# Patient Record
Sex: FEMALE | Race: WHITE
[De-identification: names, ages, dates, MRNs, and addresses within clinical notes are randomized per-mention and may not be internally consistent; named-entity substitution may affect disease eponyms.]

---

## 2017-02-21 PROCEDURE — 83516 IMMUNOASSAY NONANTIBODY: CPT | Performed by: INTERNAL MEDICINE

## 2017-02-21 PROCEDURE — 82784 ASSAY IGA/IGD/IGG/IGM EACH: CPT | Performed by: INTERNAL MEDICINE

## 2017-02-21 PROCEDURE — 86255 FLUORESCENT ANTIBODY SCREEN: CPT | Performed by: INTERNAL MEDICINE

## 2017-02-21 PROCEDURE — 82607 VITAMIN B-12: CPT | Performed by: INTERNAL MEDICINE

## 2017-03-01 PROBLEM — M25.532 BILATERAL WRIST PAIN: Status: ACTIVE | Noted: 2017-03-01

## 2017-03-01 PROBLEM — M25.531 BILATERAL WRIST PAIN: Status: ACTIVE | Noted: 2017-03-01

## 2017-03-01 PROBLEM — M75.102 ROTATOR CUFF SYNDROME, LEFT: Status: ACTIVE | Noted: 2017-03-01

## 2017-03-01 PROBLEM — G56.03 BILATERAL CARPAL TUNNEL SYNDROME: Status: ACTIVE | Noted: 2017-03-01

## 2017-03-01 PROBLEM — M25.512 ACUTE PAIN OF LEFT SHOULDER: Status: ACTIVE | Noted: 2017-03-01

## 2017-03-21 PROBLEM — M25.522 LEFT ELBOW PAIN: Status: ACTIVE | Noted: 2017-03-21

## 2017-04-20 PROBLEM — G56.32 RADIAL TUNNEL SYNDROME, LEFT: Status: ACTIVE | Noted: 2017-04-20

## 2017-07-25 PROBLEM — R31.0 GROSS HEMATURIA: Status: ACTIVE | Noted: 2017-07-25

## 2017-07-25 PROCEDURE — 88108 CYTOPATH CONCENTRATE TECH: CPT | Performed by: UROLOGY

## 2017-10-06 PROBLEM — M77.02 MEDIAL EPICONDYLITIS OF LEFT ELBOW: Status: ACTIVE | Noted: 2017-10-06

## 2017-10-12 PROBLEM — Z47.89 AFTERCARE FOLLOWING SURGERY OF THE MUSCULOSKELETAL SYSTEM: Status: ACTIVE | Noted: 2017-10-12

## 2017-10-30 PROBLEM — G43.009 MIGRAINE WITHOUT AURA AND WITHOUT STATUS MIGRAINOSUS, NOT INTRACTABLE: Status: ACTIVE | Noted: 2017-10-30

## 2019-05-30 PROCEDURE — 81001 URINALYSIS AUTO W/SCOPE: CPT | Performed by: INTERNAL MEDICINE

## 2020-02-25 PROCEDURE — 88305 TISSUE EXAM BY PATHOLOGIST: CPT | Performed by: INTERNAL MEDICINE

## 2020-03-16 ENCOUNTER — TELEPHONE (OUTPATIENT)
Dept: SCHEDULING | Age: 60
End: 2020-03-16

## 2020-03-17 ENCOUNTER — APPOINTMENT (OUTPATIENT)
Dept: URGENT CARE | Age: 60
End: 2020-03-17

## 2020-03-17 ENCOUNTER — WALK IN (OUTPATIENT)
Dept: URGENT CARE | Age: 60
End: 2020-03-17

## 2020-03-17 VITALS — TEMPERATURE: 98.1 F

## 2020-03-17 DIAGNOSIS — Z23 NEED FOR VACCINATION: Primary | ICD-10-CM

## 2020-03-17 PROCEDURE — 90471 IMMUNIZATION ADMIN: CPT | Performed by: NURSE PRACTITIONER

## 2020-03-17 PROCEDURE — 90686 IIV4 VACC NO PRSV 0.5 ML IM: CPT | Performed by: NURSE PRACTITIONER

## 2021-01-22 PROCEDURE — 88305 TISSUE EXAM BY PATHOLOGIST: CPT | Performed by: OBSTETRICS & GYNECOLOGY

## 2021-08-21 ENCOUNTER — HOSPITAL ENCOUNTER (EMERGENCY)
Facility: HOSPITAL | Age: 61
Discharge: HOME | End: 2021-08-21
Attending: EMERGENCY MEDICINE
Payer: COMMERCIAL

## 2021-08-21 ENCOUNTER — APPOINTMENT (OUTPATIENT)
Dept: CT IMAGING | Facility: HOSPITAL | Age: 61
End: 2021-08-21
Attending: EMERGENCY MEDICINE
Payer: COMMERCIAL

## 2021-08-21 VITALS
BODY MASS INDEX: 24.8 KG/M2 | OXYGEN SATURATION: 97 % | TEMPERATURE: 98 F | SYSTOLIC BLOOD PRESSURE: 123 MMHG | DIASTOLIC BLOOD PRESSURE: 64 MMHG | WEIGHT: 140 LBS | RESPIRATION RATE: 18 BRPM | HEIGHT: 63 IN | HEART RATE: 72 BPM

## 2021-08-21 DIAGNOSIS — N30.01 ACUTE CYSTITIS WITH HEMATURIA: Primary | ICD-10-CM

## 2021-08-21 LAB
ALBUMIN SERPL-MCNC: 3.7 G/DL (ref 3.4–5)
ALP LIVER SERPL-CCNC: 98 U/L
ALT SERPL-CCNC: 44 U/L
ANION GAP SERPL CALC-SCNC: 3 MMOL/L (ref 0–18)
AST SERPL-CCNC: 22 U/L (ref 15–37)
BASOPHILS # BLD AUTO: 0.02 X10(3) UL (ref 0–0.2)
BASOPHILS NFR BLD AUTO: 0.6 %
BILIRUB DIRECT SERPL-MCNC: <0.1 MG/DL (ref 0–0.2)
BILIRUB SERPL-MCNC: 0.3 MG/DL (ref 0.1–2)
BILIRUB UR QL: NEGATIVE
BUN BLD-MCNC: 16 MG/DL (ref 7–18)
BUN/CREAT SERPL: 22.2 (ref 10–20)
CALCIUM BLD-MCNC: 8.9 MG/DL (ref 8.5–10.1)
CHLORIDE SERPL-SCNC: 108 MMOL/L (ref 98–112)
CO2 SERPL-SCNC: 31 MMOL/L (ref 21–32)
COLOR UR: YELLOW
CREAT BLD-MCNC: 0.72 MG/DL
DEPRECATED RDW RBC AUTO: 46 FL (ref 35.1–46.3)
EOSINOPHIL # BLD AUTO: 0.08 X10(3) UL (ref 0–0.7)
EOSINOPHIL NFR BLD AUTO: 2.4 %
ERYTHROCYTE [DISTWIDTH] IN BLOOD BY AUTOMATED COUNT: 13.3 % (ref 11–15)
GLUCOSE BLD-MCNC: 101 MG/DL (ref 70–99)
GLUCOSE UR-MCNC: NEGATIVE MG/DL
HCT VFR BLD AUTO: 44.1 %
HGB BLD-MCNC: 14.4 G/DL
IMM GRANULOCYTES # BLD AUTO: 0 X10(3) UL (ref 0–1)
IMM GRANULOCYTES NFR BLD: 0 %
KETONES UR-MCNC: NEGATIVE MG/DL
LEUKOCYTE ESTERASE UR QL STRIP.AUTO: NEGATIVE
LIPASE SERPL-CCNC: 123 U/L (ref 73–393)
LYMPHOCYTES # BLD AUTO: 0.74 X10(3) UL (ref 1–4)
LYMPHOCYTES NFR BLD AUTO: 22.2 %
M PROTEIN MFR SERPL ELPH: 7.7 G/DL (ref 6.4–8.2)
MCH RBC QN AUTO: 30.1 PG (ref 26–34)
MCHC RBC AUTO-ENTMCNC: 32.7 G/DL (ref 31–37)
MCV RBC AUTO: 92.1 FL
MONOCYTES # BLD AUTO: 0.44 X10(3) UL (ref 0.1–1)
MONOCYTES NFR BLD AUTO: 13.2 %
NEUTROPHILS # BLD AUTO: 2.06 X10 (3) UL (ref 1.5–7.7)
NEUTROPHILS # BLD AUTO: 2.06 X10(3) UL (ref 1.5–7.7)
NEUTROPHILS NFR BLD AUTO: 61.6 %
NITRITE UR QL STRIP.AUTO: NEGATIVE
OSMOLALITY SERPL CALC.SUM OF ELEC: 295 MOSM/KG (ref 275–295)
PH UR: 5 [PH] (ref 5–8)
PLATELET # BLD AUTO: 203 10(3)UL (ref 150–450)
POTASSIUM SERPL-SCNC: 3.7 MMOL/L (ref 3.5–5.1)
PROT UR-MCNC: 30 MG/DL
RBC # BLD AUTO: 4.79 X10(6)UL
RBC #/AREA URNS AUTO: >10 /HPF
SODIUM SERPL-SCNC: 142 MMOL/L (ref 136–145)
SP GR UR STRIP: 1.03 (ref 1–1.03)
UROBILINOGEN UR STRIP-ACNC: <2
WBC # BLD AUTO: 3.3 X10(3) UL (ref 4–11)

## 2021-08-21 PROCEDURE — 80048 BASIC METABOLIC PNL TOTAL CA: CPT

## 2021-08-21 PROCEDURE — 81001 URINALYSIS AUTO W/SCOPE: CPT | Performed by: EMERGENCY MEDICINE

## 2021-08-21 PROCEDURE — 83690 ASSAY OF LIPASE: CPT | Performed by: EMERGENCY MEDICINE

## 2021-08-21 PROCEDURE — 85025 COMPLETE CBC W/AUTO DIFF WBC: CPT

## 2021-08-21 PROCEDURE — 85025 COMPLETE CBC W/AUTO DIFF WBC: CPT | Performed by: EMERGENCY MEDICINE

## 2021-08-21 PROCEDURE — 74176 CT ABD & PELVIS W/O CONTRAST: CPT | Performed by: EMERGENCY MEDICINE

## 2021-08-21 PROCEDURE — 87086 URINE CULTURE/COLONY COUNT: CPT | Performed by: EMERGENCY MEDICINE

## 2021-08-21 PROCEDURE — 96360 HYDRATION IV INFUSION INIT: CPT

## 2021-08-21 PROCEDURE — 80076 HEPATIC FUNCTION PANEL: CPT | Performed by: EMERGENCY MEDICINE

## 2021-08-21 PROCEDURE — 96361 HYDRATE IV INFUSION ADD-ON: CPT

## 2021-08-21 PROCEDURE — 80048 BASIC METABOLIC PNL TOTAL CA: CPT | Performed by: EMERGENCY MEDICINE

## 2021-08-21 PROCEDURE — 99284 EMERGENCY DEPT VISIT MOD MDM: CPT

## 2021-08-21 RX ORDER — ACETAMINOPHEN 500 MG
1000 TABLET ORAL ONCE
Status: COMPLETED | OUTPATIENT
Start: 2021-08-21 | End: 2021-08-21

## 2021-08-21 RX ORDER — CEPHALEXIN 500 MG/1
500 CAPSULE ORAL 2 TIMES DAILY
Qty: 10 CAPSULE | Refills: 0 | Status: SHIPPED | OUTPATIENT
Start: 2021-08-21 | End: 2021-08-26

## 2021-08-21 NOTE — ED PROVIDER NOTES
Patient Seen in: [facility] AND [facility] Emergency Department    History   Patient presents with:  Abdomen/Flank Pain      HPI    57-year-old female presents the ER with complaint of left lower quadrant pain.   Patient states that she has been having the pain on NASAL SCOPY,REMV TOTL ETHMOID  11/5/08    Performed by Lala Torres at [facility]   • OTHER SURGICAL HISTORY  12-23-09    Cystoscopy -Dr. Beau Sherman   • OTHER SURGICAL HISTORY  8/30/2016    Cystoscopy - Dr. Norma Guerra interaction with the patient were taken. The patient was already wearing a droplet mask on my arrival to the room.  Personal protective equipment including droplet mask, eye protection, and gloves were worn throughout the duration of the exam.  Handwashing Notable for the following components:    Clarity Urine Cloudy (*)     Blood Urine Large (*)     Protein Urine 30  (*)     WBC Urine 11-20 (*)     RBC Urine >10 (*)     Bacteria Urine 2+ (*)     Squamous Epi.  Cells Few (*)     All other components within no 8/21/21 1222)   acetaminophen (TYLENOL EXTRA STRENGTH) tab 1,000 mg (1,000 mg Oral Given 8/21/21 1321)         MDM      08/21/21  1120   BP: 155/79   Pulse: 81   Resp: 18   Temp: 97.6 °F (36.4 °C)   SpO2: 97%   Weight: 63.5 kg   Height: 160 cm (5' 3\")

## 2021-08-21 NOTE — ED INITIAL ASSESSMENT (HPI)
Pt report luq pain that radiates into her back with diarrhea for the past 4 days.  Pt denies any fever or urinary symptoms

## 2021-08-31 PROBLEM — M47.812 CERVICAL SPONDYLOSIS WITHOUT MYELOPATHY: Status: ACTIVE | Noted: 2021-08-31

## 2024-12-26 ENCOUNTER — HOSPITAL ENCOUNTER (EMERGENCY)
Facility: HOSPITAL | Age: 64
Discharge: HOME | End: 2024-12-26
Attending: EMERGENCY MEDICINE
Payer: COMMERCIAL

## 2024-12-26 ENCOUNTER — APPOINTMENT (OUTPATIENT)
Dept: GENERAL RADIOLOGY | Facility: HOSPITAL | Age: 64
End: 2024-12-26
Attending: EMERGENCY MEDICINE
Payer: COMMERCIAL

## 2024-12-26 VITALS
TEMPERATURE: 98 F | HEART RATE: 77 BPM | OXYGEN SATURATION: 98 % | SYSTOLIC BLOOD PRESSURE: 150 MMHG | DIASTOLIC BLOOD PRESSURE: 79 MMHG | HEIGHT: 63 IN | RESPIRATION RATE: 18 BRPM | WEIGHT: 140 LBS | BODY MASS INDEX: 24.8 KG/M2

## 2024-12-26 DIAGNOSIS — R07.9 CHEST PAIN OF UNCERTAIN ETIOLOGY: Primary | ICD-10-CM

## 2024-12-26 LAB
ANION GAP SERPL CALC-SCNC: 7 MMOL/L (ref 0–18)
ATRIAL RATE: 72 BPM
BASOPHILS # BLD AUTO: 0.04 X10(3) UL (ref 0–0.2)
BASOPHILS NFR BLD AUTO: 0.7 %
BUN BLD-MCNC: 15 MG/DL (ref 9–23)
BUN/CREAT SERPL: 23.4 (ref 10–20)
CALCIUM BLD-MCNC: 10.2 MG/DL (ref 8.7–10.4)
CHLORIDE SERPL-SCNC: 105 MMOL/L (ref 98–112)
CO2 SERPL-SCNC: 31 MMOL/L (ref 21–32)
CREAT BLD-MCNC: 0.64 MG/DL
DEPRECATED RDW RBC AUTO: 46.3 FL (ref 35.1–46.3)
EGFRCR SERPLBLD CKD-EPI 2021: 97 ML/MIN/1.73M2 (ref 60–?)
EOSINOPHIL # BLD AUTO: 0.25 X10(3) UL (ref 0–0.7)
EOSINOPHIL NFR BLD AUTO: 4.1 %
ERYTHROCYTE [DISTWIDTH] IN BLOOD BY AUTOMATED COUNT: 14.1 % (ref 11–15)
GLUCOSE BLD-MCNC: 91 MG/DL (ref 70–99)
HCT VFR BLD AUTO: 37.4 %
HGB BLD-MCNC: 12.2 G/DL
IMM GRANULOCYTES # BLD AUTO: 0.01 X10(3) UL (ref 0–1)
IMM GRANULOCYTES NFR BLD: 0.2 %
LYMPHOCYTES # BLD AUTO: 1.55 X10(3) UL (ref 1–4)
LYMPHOCYTES NFR BLD AUTO: 25.4 %
MCH RBC QN AUTO: 29.2 PG (ref 26–34)
MCHC RBC AUTO-ENTMCNC: 32.6 G/DL (ref 31–37)
MCV RBC AUTO: 89.5 FL
MONOCYTES # BLD AUTO: 0.68 X10(3) UL (ref 0.1–1)
MONOCYTES NFR BLD AUTO: 11.1 %
NEUTROPHILS # BLD AUTO: 3.57 X10 (3) UL (ref 1.5–7.7)
NEUTROPHILS # BLD AUTO: 3.57 X10(3) UL (ref 1.5–7.7)
NEUTROPHILS NFR BLD AUTO: 58.5 %
OSMOLALITY SERPL CALC.SUM OF ELEC: 296 MOSM/KG (ref 275–295)
P AXIS: 45 DEGREES
P-R INTERVAL: 148 MS
PLATELET # BLD AUTO: 278 10(3)UL (ref 150–450)
POTASSIUM SERPL-SCNC: 3.7 MMOL/L (ref 3.5–5.1)
Q-T INTERVAL: 396 MS
QRS DURATION: 80 MS
QTC CALCULATION (BEZET): 433 MS
R AXIS: 71 DEGREES
RBC # BLD AUTO: 4.18 X10(6)UL
SODIUM SERPL-SCNC: 143 MMOL/L (ref 136–145)
T AXIS: 61 DEGREES
TROPONIN I SERPL HS-MCNC: 4 NG/L
TROPONIN I SERPL HS-MCNC: 4 NG/L
VENTRICULAR RATE: 72 BPM
WBC # BLD AUTO: 6.1 X10(3) UL (ref 4–11)

## 2024-12-26 PROCEDURE — 93005 ELECTROCARDIOGRAM TRACING: CPT

## 2024-12-26 PROCEDURE — 85025 COMPLETE CBC W/AUTO DIFF WBC: CPT | Performed by: EMERGENCY MEDICINE

## 2024-12-26 PROCEDURE — 36415 COLL VENOUS BLD VENIPUNCTURE: CPT

## 2024-12-26 PROCEDURE — 99284 EMERGENCY DEPT VISIT MOD MDM: CPT

## 2024-12-26 PROCEDURE — 80048 BASIC METABOLIC PNL TOTAL CA: CPT | Performed by: EMERGENCY MEDICINE

## 2024-12-26 PROCEDURE — 93010 ELECTROCARDIOGRAM REPORT: CPT

## 2024-12-26 PROCEDURE — 84484 ASSAY OF TROPONIN QUANT: CPT | Performed by: EMERGENCY MEDICINE

## 2024-12-26 PROCEDURE — 99285 EMERGENCY DEPT VISIT HI MDM: CPT

## 2024-12-26 PROCEDURE — 71045 X-RAY EXAM CHEST 1 VIEW: CPT | Performed by: EMERGENCY MEDICINE

## 2024-12-26 RX ORDER — ACETAMINOPHEN 500 MG
TABLET ORAL
Status: COMPLETED
Start: 2024-12-26 | End: 2024-12-26

## 2024-12-26 RX ORDER — ASPIRIN 81 MG/1
324 TABLET, CHEWABLE ORAL ONCE
Status: COMPLETED | OUTPATIENT
Start: 2024-12-26 | End: 2024-12-26

## 2024-12-26 RX ORDER — ASPIRIN 81 MG/1
TABLET, CHEWABLE ORAL
Status: COMPLETED
Start: 2024-12-26 | End: 2024-12-26

## 2024-12-26 RX ORDER — ACETAMINOPHEN 500 MG
1000 TABLET ORAL ONCE
Status: COMPLETED | OUTPATIENT
Start: 2024-12-26 | End: 2024-12-26

## 2024-12-26 NOTE — DISCHARGE INSTRUCTIONS
Please see Dr. Carroll at 10:30 AM at the [place] office as discussed.  Return for changes or worsening and read all instruction.  Read instructions.

## 2024-12-26 NOTE — ED PROVIDER NOTES
Patient Seen in: [facility] Emergency Department      History     Chief Complaint   Patient presents with    Chest Pain Angina     Stated Complaint: Chest Pain    Subjective:   Patient with history of endometriosis, kidney stones current medications with no cardiac risk factors here with 10 days of intermittent chest tightness in the substernal region.  It started today while walking the dog.  When she got home it still was present and went to urgent care and it lasted probably a couple hours.  She did not really get nauseous or diaphoresis.  No sharp or tearing pains.  No radiation.  No dizziness shortness of breath.  No cough or fever.  It also happened 2 days ago while walking up some stairs.  She is also had some episodes at rest this past week.  No leg swelling.  No trauma or travel.  No family history of heart disease.  No tobacco.  She said urgent care her blood pressure was a little elevated which is not normal for her.              Objective:     Past Medical History:    Endometriosis    Headache(784.0)    chronic headaches in the posterior neck since a fall 10 years ago, failed meds, injections    KIDNEY STONE    last yr 2008    Migraine without aura and without status migrainosus, not intractable    Pulmonary Function Studies normal    NORMAL PFTs    Renal cyst    SINUSITIS    Unspecified sleep apnea    AHI 5 REM AHI 13 Sao2 Javon 91%              Past Surgical History:   Procedure Laterality Date    Colonoscopy  09/2020    Colonoscopy,diagnostic  9/28/09    Performed by WILLIAM MONTANO at [facility], [facility]    Excision turbinate,submucous  11/5/08    Performed by EVE PHELAN at [facility], [facility]    Excision turbinate,submucous  11/5/08    Performed by EVE PHELAN at [facility], [facility]    Nasal scopy,explor frontal sinus  11/5/08    Performed by EVE PHELAN at [facility], [facility]    Nasal scopy,explor frontal sinus  11/5/08     Performed by EVE PHELAN at [facility], [facility]    Nasal scopy,open maxill sinus  11/5/08    Performed by EVE PHELAN at [facility], [facility]    Nasal scopy,open maxill sinus  11/5/08    Performed by EVE PHELAN at [facility], [facility]    Nasal scopy,remv totl ethmoid  11/5/08    Performed by EVE PHELAN at [facility], [facility]    Nasal scopy,remv totl ethmoid  11/5/08    Performed by EVE PHELAN at [facility], [facility]    Other surgical history  12-23-09    Cystoscopy -Dr. Griffiths    Other surgical history  8/30/2016    Cystoscopy - Dr. Preston     Removal of ovary/tube(s)      btw her pregnancies; benign; sounds like she may have had endometrioma    Repair of nasal septum  11/5/08    Performed by EVE PHELAN at [facility], [facility]    Total abdom hysterectomy  1988    with unilateral oopherectomy; for endometriosis and fibroids per pt                Social History     Socioeconomic History    Marital status:    Tobacco Use    Smoking status: Never    Smokeless tobacco: Never   Vaping Use    Vaping status: Never Used   Substance and Sexual Activity    Alcohol use: No     Alcohol/week: 0.0 standard drinks of alcohol     Comment: 2 glasses of wine per month.     Drug use: No                  Physical Exam     ED Triage Vitals [12/26/24 1258]   BP (!) 169/77   Pulse 73   Resp 18   Temp 97.9 °F (36.6 °C)   Temp src Temporal   SpO2 97 %   O2 Device None (Room air)       Current Vitals:   Vital Signs  BP: 152/70  Pulse: 74  Resp: 18  Temp: 98 °F (36.7 °C)  Temp src: Oral    Oxygen Therapy  SpO2: 97 %  O2 Device: None (Room air)        Physical Exam  HENT:      Head: Normocephalic.      Mouth/Throat:      Mouth: Mucous membranes are moist.      Pharynx: Oropharynx is clear.   Eyes:      Extraocular Movements: Extraocular movements intact.      Pupils: Pupils are equal, round, and reactive to light.    Cardiovascular:      Rate and Rhythm: Normal rate and regular rhythm.      Heart sounds: Normal heart sounds.   Pulmonary:      Effort: Pulmonary effort is normal.      Breath sounds: Normal breath sounds.   Abdominal:      Palpations: Abdomen is soft.      Tenderness: There is no abdominal tenderness.   Musculoskeletal:         General: No swelling or tenderness. Normal range of motion.      Cervical back: Normal range of motion.   Lymphadenopathy:      Cervical: No cervical adenopathy.   Skin:     General: Skin is warm.      Capillary Refill: Capillary refill takes less than 2 seconds.   Neurological:      General: No focal deficit present.      Mental Status: She is alert.             ED Course     Labs Reviewed   BASIC METABOLIC PANEL (8) - Abnormal; Notable for the following components:       Result Value    BUN/CREA Ratio 23.4 (*)     Calculated Osmolality 296 (*)     All other components within normal limits   TROPONIN I HIGH SENSITIVITY - Normal   TROPONIN I HIGH SENSITIVITY - Normal   CBC WITH DIFFERENTIAL WITH PLATELET   TROPONIN I HIGH SENSITIVITY   RAINBOW DRAW LAVENDER   RAINBOW DRAW LIGHT GREEN   RAINBOW DRAW BLUE   RAINBOW DRAW GOLD     EKG    Rate, intervals and axes as noted on EKG Report.  Rate: 72  Rhythm: Sinus Rhythm  Reading: EKG normal sinus rhythm and rate.  Normal axis and intervals.  Normal ST segments.  This EKG was interpreted by me.  Normal           ED Course as of 12/26/24 1903  ------------------------------------------------------------  Time: 12/26 1629  Comment: Labs independently interpreted by me.  CBC normal.  Discussed with Dr. Carroll who states that the patient does not want to be admitted he can see her at 10:30 AM tomorrow in his office here on campus.  ------------------------------------------------------------  Time: 12/26 1630  Comment: Chest x-ray independently interpreted by me shows no acute  process  ------------------------------------------------------------  Time: 12/26 1641  Comment: CBC BMP and first troponin normal.  ------------------------------------------------------------  Time: 12/26 1722  Value: XR CHEST AP PORTABLE  (CPT=71045)  Comment: (Reviewed)  ------------------------------------------------------------  Time: 12/26 1746  Comment: EKG repeated.  First 1 was at 1245 which was about 5 hours ago.  EKG is still normal.  Normal sinus rhythm and rate.  Normal axis and intervals.  Normal ST segments.  This EKG was interpreted by me.  No change.  ------------------------------------------------------------  Time: 12/26 1901  Comment: Repeat troponin is in the normal range and did not change.  Heart score would be 1.  Repeat EKG was unchanged and normal.  Patient is comfortable here and comfortable going to the office at 10:30 in the morning.  She will return or call 911 for changes or worsening.              [facility]      XR CHEST AP PORTABLE  (CPT=71045)    Result Date: 12/26/2024  CONCLUSION:  1. No acute cardiopulmonary finding.    Dictated by (CST): Damian Muir MD on 12/26/2024 at 4:20 PM     Finalized by (CST): Damian Muir MD on 12/26/2024 at 4:21 PM                 Medical Decision Making  Patient with intermittent chest pressure for 10 days.  No significant risks.  EKG is normal.  Pulse ox normal 97% on room air.  Differential could include but not limited to ACS, angina, dissection, PE, pleurisy, GI problems, pericarditis,  pleural effusion and many other possibilities.  She looks good.  Pulses are equal.  PE and dissection unlikely.    Amount and/or Complexity of Data Reviewed  External Data Reviewed: notes.     Details: Urgent care notes reviewed  Labs: ordered. Decision-making details documented in ED Course.  Radiology: ordered and independent interpretation performed. Decision-making details documented in ED Course.  ECG/medicine tests: ordered and independent interpretation  performed. Decision-making details documented in ED Course.  Discussion of management or test interpretation with external provider(s): Patient was given an aspirin here.  Discussed with cardiologist.  Will return for changes or worsening.    Risk  OTC drugs.        Disposition and Plan     Clinical Impression:  1. Chest pain of uncertain etiology         Disposition:  Discharge  12/26/2024  7:02 pm    Follow-up:  Phani Carroll MD  [address]  [place] [place] [address]  [phone]    Follow up      We recommend that you schedule follow up care with a primary care provider within the next three months to obtain basic health screening including reassessment of your blood pressure.      Medications Prescribed:  There are no discharge medications for this patient.          Supplementary Documentation:

## 2024-12-26 NOTE — ED INITIAL ASSESSMENT (HPI)
Patient sent by IC. C/o chest pain x1 week, HTN, and Headache.   Denies SOB, breathing even and unlabored. Increased CP with activity.   Denies cardiac history.

## 2024-12-27 VITALS — BODY MASS INDEX: 24.45 KG/M2 | HEIGHT: 63 IN | WEIGHT: 138 LBS

## 2024-12-27 RX ORDER — ASPIRIN 81 MG/1
324 TABLET, CHEWABLE ORAL ONCE
Status: CANCELLED | OUTPATIENT
Start: 2024-12-27 | End: 2024-12-27

## 2024-12-27 RX ORDER — ASPIRIN 81 MG/1
324 TABLET, CHEWABLE ORAL ONCE
COMMUNITY
End: 2024-12-31

## 2024-12-27 RX ORDER — SODIUM CHLORIDE 9 MG/ML
INJECTION, SOLUTION INTRAVENOUS
Status: CANCELLED | OUTPATIENT
Start: 2024-12-28 | End: 2024-12-28

## 2024-12-28 LAB
ATRIAL RATE: 78 BPM
P AXIS: 21 DEGREES
P-R INTERVAL: 150 MS
Q-T INTERVAL: 406 MS
QRS DURATION: 86 MS
QTC CALCULATION (BEZET): 462 MS
R AXIS: 57 DEGREES
T AXIS: 50 DEGREES
VENTRICULAR RATE: 78 BPM

## 2024-12-30 ENCOUNTER — HOSPITAL ENCOUNTER (OUTPATIENT)
Dept: INTERVENTIONAL RADIOLOGY/VASCULAR | Facility: HOSPITAL | Age: 64
Discharge: HOME | End: 2024-12-30
Attending: INTERNAL MEDICINE
Payer: COMMERCIAL

## 2024-12-31 ENCOUNTER — HOSPITAL ENCOUNTER (OUTPATIENT)
Dept: INTERVENTIONAL RADIOLOGY/VASCULAR | Facility: HOSPITAL | Age: 64
Discharge: HOME | End: 2024-12-31
Attending: INTERNAL MEDICINE | Admitting: INTERNAL MEDICINE
Payer: COMMERCIAL

## 2024-12-31 VITALS
HEIGHT: 63 IN | SYSTOLIC BLOOD PRESSURE: 123 MMHG | DIASTOLIC BLOOD PRESSURE: 61 MMHG | BODY MASS INDEX: 24.8 KG/M2 | OXYGEN SATURATION: 95 % | HEART RATE: 70 BPM | WEIGHT: 140 LBS | TEMPERATURE: 97 F | RESPIRATION RATE: 12 BRPM

## 2024-12-31 DIAGNOSIS — I20.9 ANGINA PECTORIS (HCC): ICD-10-CM

## 2024-12-31 PROCEDURE — B2111ZZ FLUOROSCOPY OF MULTIPLE CORONARY ARTERIES USING LOW OSMOLAR CONTRAST: ICD-10-PCS | Performed by: INTERNAL MEDICINE

## 2024-12-31 PROCEDURE — 99152 MOD SED SAME PHYS/QHP 5/>YRS: CPT | Performed by: INTERNAL MEDICINE

## 2024-12-31 PROCEDURE — B2151ZZ FLUOROSCOPY OF LEFT HEART USING LOW OSMOLAR CONTRAST: ICD-10-PCS | Performed by: INTERNAL MEDICINE

## 2024-12-31 PROCEDURE — 93458 L HRT ARTERY/VENTRICLE ANGIO: CPT | Performed by: INTERNAL MEDICINE

## 2024-12-31 PROCEDURE — 4A023N7 MEASUREMENT OF CARDIAC SAMPLING AND PRESSURE, LEFT HEART, PERCUTANEOUS APPROACH: ICD-10-PCS | Performed by: INTERNAL MEDICINE

## 2024-12-31 RX ORDER — SODIUM CHLORIDE 9 MG/ML
INJECTION, SOLUTION INTRAVENOUS
Status: DISCONTINUED | OUTPATIENT
Start: 2024-12-31 | End: 2024-12-31

## 2024-12-31 RX ORDER — LIDOCAINE HYDROCHLORIDE 20 MG/ML
INJECTION, SOLUTION EPIDURAL; INFILTRATION; INTRACAUDAL; PERINEURAL
Status: COMPLETED
Start: 2024-12-31 | End: 2024-12-31

## 2024-12-31 RX ORDER — ROSUVASTATIN CALCIUM 40 MG/1
40 TABLET, COATED ORAL NIGHTLY
Qty: 90 TABLET | Refills: 3 | Status: SHIPPED | OUTPATIENT
Start: 2024-12-31

## 2024-12-31 RX ORDER — LISINOPRIL 20 MG/1
20 TABLET ORAL DAILY
Qty: 90 TABLET | Refills: 3 | Status: SHIPPED | OUTPATIENT
Start: 2024-12-31

## 2024-12-31 RX ORDER — NITROGLYCERIN 20 MG/100ML
INJECTION INTRAVENOUS
Status: COMPLETED
Start: 2024-12-31 | End: 2024-12-31

## 2024-12-31 RX ORDER — ASPIRIN 81 MG/1
81 TABLET ORAL DAILY
Qty: 90 TABLET | Refills: 0 | Status: SHIPPED | COMMUNITY
Start: 2024-12-31

## 2024-12-31 RX ORDER — IOPAMIDOL 612 MG/ML
60 INJECTION, SOLUTION INTRAVASCULAR
Status: COMPLETED | OUTPATIENT
Start: 2024-12-31 | End: 2024-12-31

## 2024-12-31 RX ORDER — MIDAZOLAM HYDROCHLORIDE 1 MG/ML
INJECTION INTRAMUSCULAR; INTRAVENOUS
Status: COMPLETED
Start: 2024-12-31 | End: 2024-12-31

## 2024-12-31 RX ORDER — ASPIRIN 81 MG/1
324 TABLET, CHEWABLE ORAL ONCE
Status: DISCONTINUED | OUTPATIENT
Start: 2024-12-31 | End: 2024-12-31

## 2024-12-31 RX ORDER — MIDAZOLAM HYDROCHLORIDE 1 MG/ML
INJECTION INTRAMUSCULAR; INTRAVENOUS
Status: DISCONTINUED
Start: 2024-12-31 | End: 2024-12-31 | Stop reason: WASHOUT

## 2024-12-31 RX ORDER — HEPARIN SODIUM 1000 [USP'U]/ML
INJECTION, SOLUTION INTRAVENOUS; SUBCUTANEOUS
Status: COMPLETED
Start: 2024-12-31 | End: 2024-12-31

## 2024-12-31 RX ADMIN — IOPAMIDOL 60 ML: 612 INJECTION, SOLUTION INTRAVASCULAR at 10:54:00

## 2024-12-31 NOTE — DISCHARGE INSTRUCTIONS
TRANSRADIAL DISCHARGE INSTRUCTION  HOME CARE INSTRUCTIONS FOLLOWING CORONARY ANGIOGRAPHY    Activity  DO NOT drive after the procedure.  You may resume driving late the following day according to the nurse or physician's instructions  Plan on resting and relaxing tonight and tomorrow  Resume your normal activity after 48 hours, or as instructed by your physician  Avoid drinking alcohol for the next 24 hours  Avoid wrist flexion, extension, and fine motor activities (i.e. texting, typing, using computer mouse, eating etc.) with the affected hand for 24 hours  Do not lift or pull anything heavier than 5 to 8  pounds with affected hand for 1 week. Nothing heavier than a gallon of milk.    What is Normal?  A small lump at the procedure site associated with mild tenderness when touched  The procedure site may be bruised or discolored  There may be a small amount of drainage on the bandage    Special Instructions   Drink plenty of fluids during the next 24 hours to \"flush\" the contrast from your system  Keep the bandage clean and dry  After 24 hours, you must remove the bandage and arm board. TOMORROW AT _______. Do not put ointment, powders, or creams to site.  You can shower after removing the bandage, and wash the procedure site gently with soap and water  DO NOT submerge the procedure site for 1 week (no bath tubs or pools or washing dishes)  If you choose to wear a bandage for a few days, make sure it remains clean and dry and that it is changed daily  For local swelling: apply ice  Bleeding can occur at the procedure site - both on the outside of the skin and/or beneath the surface of the skin        If bleeding occurs: Elevate hand above heart and apply local pressure  Swelling or a large lump at the procedure site can occur, which may be accompanied by moderate to severe pain  If the bleeding does not stop, call 911 and continue to apply pressure    When to contact physician:   Swelling, pain, or bleeding at the  site that is not relieved by applying ice or pressure  Signs of infection: Redness, warmth, drainage at the site, chills, or temperature of 100.5 or greater  Changes in sensation, numbness, or tingling of affected hand    Other    You may resume your present diet, unless otherwise specified by your physician.  A list of your medications was provided to you at discharge.    **If you have any question or concern, please call the on-call nurse at [phone]

## 2024-12-31 NOTE — IVS NOTE
DISCHARGE NOTE      Pt is able to sit up and ambulate without difficulty.   Pt voided and tolerated fluids and food.   Procedural site remains dry and intact with good circulation, motion, and sensation.   No signs and symptoms of bleeding/hematoma noted.   IV access removed  Instruction provided, patient/family verbalizes understanding.   Dr. Horn    spoke with patient/family post procedure.      Pt discharge via wheelchair to [facility]      Follow up Appointment: make appt in 1-2 weeks     New Prescription: crestor, aspirin, lisinopril

## 2024-12-31 NOTE — PROCEDURES
[facility] Cardiology  Cardiac Catheterization Report    (S): Jozef Honr MD    PREOPERATIVE DIAGNOSIS: Stable angina    POSTOPERATIVE DIAGNOSIS: Non obstructive CAD    PROCEDURE PERFORMED: Left heart catheterization with selective coronary arteriography     CONSENT: The risks, benefits, and alternatives of cardiac catheterization were discussed with the patient.  The risks included, but were not limited to: bleeding, limb ischemia, deep venous thrombosis, allergic reaction, infection, stroke, myocardial infarction,  and death.  Benefits of the procedure included: symptomatic improvement, diagnosis of heart disease and prevention of myocardial infarction.  Alternatives to the procedure included: not performing cardiac catheterization, treatment with medications only, and observation. The patient verbalized understanding and agreed to proceed with the procedure.     CATHETERS: 6F JL 4, JR4    VASCULAR CLOSURE: TR Band    SEDATION: 2 mg Versed, 75 mcg Fentanyl     DESCRIPTION OF PROCEDURE:  The patient was brought to the cardiac catheterization laboratory.      Once local anesthesia was achieved, sedation was administered. The IV was maintained by the RN and moderate conscious sedation with Versed and Fentanyl was given. The patient was assessed and monitoring of oxygen, heart rate and blood pressure by nurse and myself during the exam 1029 (time of 1st dose administered when I was present) to 1051 (procedure end time).     The right radial area was prepped and draped in a sterile manner and anesthetized with 2% lidocaine.  The right radial artery was accessed, and a 6-[place] Glidesheath was placed under modified seldinger technique.  Left and right selective coronary angiography was performed using the above catheters.  A JR4 catheter was used to cross the aortic valve, measure left ventricular pressure.  At the conclusion of the study, the right radial artery hemostasis was performed using a  radial band.       FINDINGS:      HEMODYNAMICS:    The left ventricular end diastolic pressure is 10 mmHg. There was no significant gradient upon pullback across the aortic valve.     ANGIOGRAPHY:      Left main: Very large caliber vessel that bifurcates into the LAD and circumflex.  Angiographically normal.    LAD: Large-caliber Type II LAD that tapers as it reaches the apex and gives off through the moderate to large diagonal branches.  Luminal irregularities are present in the proximal segment.    Left circumflex: Very large caliber vessel that gives off 2 large caliber tortuous OM branches.  Angiographically normal.    RCA: Very large caliber vessel that gives off the PDA, which supplies the entire inferior wall, and the RPL branch.  Luminal irregularities in the proximal to mid RCA.    CONCLUSIONS:  1. Minimal non-obstructive epicardial coronary artery disease.  2. Normal left ventricular filling pressures (LVEDP 10 mmHg).    RECOMMENDATIONS:  GDMT per ACC/AHA guidelines for non-obstructive CAD.    Jozef Horn MD  [place]  12/31/2024  10:55 AM

## 2024-12-31 NOTE — INTERVAL H&P NOTE
Pre-op Diagnosis: * No pre-op diagnosis entered *    The above referenced H&P was reviewed by Jozef Horn MD on 12/31/2024, the patient was examined and no significant changes have occurred in the patient's condition since the H&P was performed.  I discussed with the patient and/or legal representative the potential benefits, risks and side effects of this procedure; the likelihood of the patient achieving goals; and potential problems that might occur during recuperation.  I discussed reasonable alternatives to the procedure, including risks, benefits and side effects related to the alternatives and risks related to not receiving this procedure.  We will proceed with procedure as planned.